# Patient Record
Sex: FEMALE | Race: WHITE | Employment: FULL TIME | URBAN - METROPOLITAN AREA
[De-identification: names, ages, dates, MRNs, and addresses within clinical notes are randomized per-mention and may not be internally consistent; named-entity substitution may affect disease eponyms.]

---

## 2022-03-26 ENCOUNTER — HOSPITAL ENCOUNTER (EMERGENCY)
Age: 22
Discharge: HOME | End: 2022-03-26
Payer: OTHER GOVERNMENT

## 2022-03-26 ENCOUNTER — APPOINTMENT (OUTPATIENT)
Dept: GENERAL RADIOLOGY | Age: 22
End: 2022-03-26
Payer: OTHER GOVERNMENT

## 2022-03-26 VITALS
WEIGHT: 157 LBS | RESPIRATION RATE: 18 BRPM | SYSTOLIC BLOOD PRESSURE: 135 MMHG | DIASTOLIC BLOOD PRESSURE: 92 MMHG | HEIGHT: 64 IN | HEART RATE: 109 BPM | OXYGEN SATURATION: 97 % | TEMPERATURE: 98.7 F | BODY MASS INDEX: 26.8 KG/M2

## 2022-03-26 DIAGNOSIS — S82.832A CLOSED FRACTURE OF DISTAL END OF LEFT FIBULA, UNSPECIFIED FRACTURE MORPHOLOGY, INITIAL ENCOUNTER: Primary | ICD-10-CM

## 2022-03-26 PROCEDURE — 99283 EMERGENCY DEPT VISIT LOW MDM: CPT

## 2022-03-26 PROCEDURE — 73610 X-RAY EXAM OF ANKLE: CPT

## 2022-03-26 PROCEDURE — 6370000000 HC RX 637 (ALT 250 FOR IP): Performed by: PHYSICIAN ASSISTANT

## 2022-03-26 PROCEDURE — 29515 APPLICATION SHORT LEG SPLINT: CPT

## 2022-03-26 RX ORDER — ACETAMINOPHEN 500 MG
1000 TABLET ORAL EVERY 8 HOURS PRN
Qty: 30 TABLET | Refills: 0 | Status: SHIPPED | OUTPATIENT
Start: 2022-03-26 | End: 2022-05-27

## 2022-03-26 RX ORDER — IBUPROFEN 600 MG/1
600 TABLET ORAL 3 TIMES DAILY PRN
Qty: 30 TABLET | Refills: 0 | Status: SHIPPED | OUTPATIENT
Start: 2022-03-26 | End: 2022-05-13

## 2022-03-26 RX ORDER — IBUPROFEN 800 MG/1
800 TABLET ORAL ONCE
Status: COMPLETED | OUTPATIENT
Start: 2022-03-26 | End: 2022-03-26

## 2022-03-26 RX ORDER — ALBUTEROL SULFATE 0.63 MG/3ML
1 SOLUTION RESPIRATORY (INHALATION) EVERY 6 HOURS PRN
COMMUNITY
End: 2022-05-13

## 2022-03-26 RX ADMIN — IBUPROFEN 800 MG: 800 TABLET, FILM COATED ORAL at 17:19

## 2022-03-26 ASSESSMENT — PAIN DESCRIPTION - ORIENTATION: ORIENTATION: LEFT

## 2022-03-26 ASSESSMENT — PAIN DESCRIPTION - PAIN TYPE: TYPE: ACUTE PAIN

## 2022-03-26 ASSESSMENT — PAIN - FUNCTIONAL ASSESSMENT: PAIN_FUNCTIONAL_ASSESSMENT: 0-10

## 2022-03-26 ASSESSMENT — ENCOUNTER SYMPTOMS
SHORTNESS OF BREATH: 0
COLOR CHANGE: 0
CHEST TIGHTNESS: 0
BACK PAIN: 0
COUGH: 0

## 2022-03-26 ASSESSMENT — PAIN DESCRIPTION - ONSET: ONSET: ON-GOING

## 2022-03-26 ASSESSMENT — PAIN DESCRIPTION - FREQUENCY: FREQUENCY: CONTINUOUS

## 2022-03-26 ASSESSMENT — PAIN SCALES - GENERAL
PAINLEVEL_OUTOF10: 6
PAINLEVEL_OUTOF10: 5

## 2022-03-26 ASSESSMENT — PAIN DESCRIPTION - LOCATION: LOCATION: ANKLE

## 2022-03-26 ASSESSMENT — PAIN DESCRIPTION - DESCRIPTORS: DESCRIPTORS: THROBBING;DULL

## 2022-03-26 NOTE — ED PROVIDER NOTES
[facility]        Department of Emergency Medicine   ED  Provider Note  Admit Date/RoomTime: 3/26/2022  3:41 PM  ED Room: [address]  HPI:  3/26/22, Time: 4:03 PM EDT      Patient is a 57-year-old female presenting the emergency department pain and swelling to her left ankle. Patient states that she slipped in mud last night and twisted her ankle. She has not been able to walk on it since. She did take naproxen last night but nothing for pain today. Patient denies any prior trauma or injury to this ankle. She also denies any numbness or tingling, significant swelling, color changes, pain to the knee or foot. The history is provided by the patient. No  was used. REVIEW OF SYSTEMS:  Review of Systems   Constitutional: Negative for activity change, chills, fatigue and fever. Respiratory: Negative for cough, chest tightness and shortness of breath. Cardiovascular: Negative for chest pain, palpitations and leg swelling. Musculoskeletal: Positive for arthralgias and joint swelling. Negative for back pain, myalgias, neck pain and neck stiffness. Skin: Negative for color change, pallor and rash. Neurological: Negative for dizziness, weakness, light-headedness and headaches. Psychiatric/Behavioral: Negative for agitation, behavioral problems and confusion. Pertinent positives and negatives are stated within HPI, all other systems reviewed and are negative.      --------------------------------------------- PAST HISTORY ---------------------------------------------  Past Medical History:  has no past medical history on file. Past Surgical History:  has no past surgical history on file. Social History:      Family History: family history is not on file. The patients home medications have been reviewed.     Allergies: Betadine [povidone-iodine] and Iodine    -------------------------------------------------- RESULTS -------------------------------------------------  All laboratory and radiology results have been personally reviewed by myself   LABS:  No results found for this visit on 03/26/22. RADIOLOGY:  Interpreted by Radiologist.  XR ANKLE LEFT (MIN 3 VIEWS)   Final Result   Distal fibular fracture, appears to extend to the ankle mortise.             ------------------------- NURSING NOTES AND VITALS REVIEWED ---------------------------   The nursing notes within the ED encounter and vital signs as below have been reviewed. BP (!) 135/92   Pulse 109   Temp 98.7 °F (37.1 °C) (Oral)   Resp 18   Ht 5' 4\" (1.626 m)   Wt 157 lb (71.2 kg)   LMP 03/04/2022 (Exact Date)   SpO2 97%   BMI 26.95 kg/m²   Oxygen Saturation Interpretation: Normal      ---------------------------------------------------PHYSICAL EXAM--------------------------------------    Physical Exam  Vitals and nursing note reviewed. Constitutional:       General: She is not in acute distress. Appearance: Normal appearance. She is well-developed. She is not ill-appearing. HENT:      Head: Normocephalic and atraumatic. Mouth/Throat:      Mouth: Mucous membranes are moist.      Pharynx: Oropharynx is clear. Neck:      Vascular: No JVD. Trachea: No tracheal deviation. Cardiovascular:      Rate and Rhythm: Normal rate and regular rhythm. Heart sounds: Normal heart sounds. No murmur heard. Pulmonary:      Effort: Pulmonary effort is normal. No respiratory distress. Breath sounds: Normal breath sounds. Musculoskeletal:         General: Swelling and tenderness present. No deformity. Normal range of motion. Cervical back: Normal range of motion and neck supple. Comments: TTP and edema to RT lateral malleolus. FROM of toes. Distal sensation intact. 2+ DPA pulse. No TTP to the foot. Pain with flexion and extension of the ankle. Skin:     General: Skin is warm and dry.       Capillary Refill: Capillary refill takes less than 2 seconds. Coloration: Skin is not pale. Findings: No erythema. Neurological:      Mental Status: She is alert and oriented to person, place, and time. Mental status is at baseline. Motor: No weakness. Psychiatric:         Mood and Affect: Mood normal.         Behavior: Behavior normal.         Thought Content: Thought content normal.            ------------------------------ ED COURSE/MEDICAL DECISION MAKING----------------------  Medications   ibuprofen (ADVIL;MOTRIN) tablet 800 mg (has no administration in time range)         ED COURSE:      XR ANKLE LEFT (MIN 3 VIEWS)   Final Result   Distal fibular fracture, appears to extend to the ankle mortise. Procedures:  Procedures     Medical Decision Making:   MDM   79-year-old female presenting to the ED with pain and swelling of her left ankle after slipping and falling last night. Patient has no signs of neurovascular compromise. Compartments are soft and compressible. Patient given ibuprofen for the pain. X-ray did show a spiral distal fibular fracture that extends through the ankle more T's. Patient has no symptoms of a proximal injury. She is placed in a short leg three-way splint and advised to be nonweightbearing. She is given a prescription for Tylenol and ibuprofen as well as sent home with crutches. She is to follow-up with orthopedics within 1 week. I did discuss the case with Ortho resident who agrees with the plan. Counseling: The emergency provider has spoken with the patient and discussed todays results, in addition to providing specific details for the plan of care and counseling regarding the diagnosis and prognosis. Questions are answered at this time and they are agreeable with the plan.      --------------------------------- IMPRESSION AND DISPOSITION ---------------------------------    IMPRESSION  1.  Closed fracture of distal end of left fibula, unspecified fracture morphology, initial encounter        DISPOSITION  Disposition: Discharge to home  Patient condition is good      Electronically signed by Js Huang PA-C   DD: 3/26/22  **This report was transcribed using voice recognition software. Every effort was made to ensure accuracy; however, inadvertent computerized transcription errors may be present.   END OF ED PROVIDER NOTE          Js Huang PA-C  03/26/22 [id number]

## 2022-03-26 NOTE — LETTER
[address] [facility] Emergency Department  [address]  [place] [address]  Phone: [phone]               March 26, 2022    Patient: Meir De Anda   YOB: 2000   Date of Visit: 3/26/2022       To Whom It May Concern:    Meir De Anda was seen and treated in our emergency department on 3/26/2022. She may return to work on when cleared by orthopedic surgeon.  .      Sincerely,       Daris Nyhan, PA-C         Signature:__________________________________

## 2022-04-04 ENCOUNTER — TELEPHONE (OUTPATIENT)
Dept: ORTHOPEDIC SURGERY | Age: 22
End: 2022-04-04

## 2022-04-04 NOTE — TELEPHONE ENCOUNTER
Call from pt stating referral sent - was seen in er 2 wks ago, YOA f/u but then referred to Fx clinic. Per [facility] referral rec'd sent to provider for review.

## 2022-04-04 NOTE — TELEPHONE ENCOUNTER
Call placed to patient, appointment made. Future Appointments   Date Time Provider Alejandro Gonzalez   4/7/2022  2:00 PM DO SE Ailin Shearer [facility]     Directions to office provided and patient verbalized understanding and is agreeable with plan.

## 2022-04-07 ENCOUNTER — OFFICE VISIT (OUTPATIENT)
Dept: ORTHOPEDIC SURGERY | Age: 22
End: 2022-04-07
Payer: OTHER GOVERNMENT

## 2022-04-07 ENCOUNTER — HOSPITAL ENCOUNTER (OUTPATIENT)
Dept: GENERAL RADIOLOGY | Age: 22
Discharge: HOME | End: 2022-04-09
Payer: OTHER GOVERNMENT

## 2022-04-07 VITALS — BODY MASS INDEX: 26.8 KG/M2 | HEIGHT: 64 IN | WEIGHT: 157 LBS

## 2022-04-07 DIAGNOSIS — S82.832A CLOSED FRACTURE OF DISTAL END OF LEFT FIBULA, UNSPECIFIED FRACTURE MORPHOLOGY, INITIAL ENCOUNTER: Primary | ICD-10-CM

## 2022-04-07 DIAGNOSIS — S82.832A CLOSED FRACTURE OF DISTAL END OF LEFT FIBULA, UNSPECIFIED FRACTURE MORPHOLOGY, INITIAL ENCOUNTER: ICD-10-CM

## 2022-04-07 PROCEDURE — 29515 APPLICATION SHORT LEG SPLINT: CPT | Performed by: PHYSICIAN ASSISTANT

## 2022-04-07 PROCEDURE — 99203 OFFICE O/P NEW LOW 30 MIN: CPT | Performed by: PHYSICIAN ASSISTANT

## 2022-04-07 PROCEDURE — 73610 X-RAY EXAM OF ANKLE: CPT

## 2022-04-07 NOTE — PROGRESS NOTES
Orthopaedic H&P Note    Vanessa Venegas is a 24 y.o. female, her YOB: 2000 with the following history as recorded in [facility]: There are no problems to display for this patient. Current Outpatient Medications   Medication Sig Dispense Refill    ibuprofen (ADVIL;MOTRIN) 600 MG tablet Take 1 tablet by mouth 3 times daily as needed for Pain 30 tablet 0    acetaminophen (TYLENOL) 500 MG tablet Take 2 tablets by mouth every 8 hours as needed for Pain 30 tablet 0    albuterol (ACCUNEB) 0.63 MG/3ML nebulizer solution Take 1 ampule by nebulization every 6 hours as needed for Wheezing Inhaler as needed (Patient not taking: Reported on 4/7/2022)       No current facility-administered medications for this visit. Allergies: Latex, Betadine [povidone-iodine], and Iodine  No past medical history on file. No past surgical history on file. No family history on file. Social History     Tobacco Use    Smoking status: Current Every Day Smoker     Start date: 4/1/2020    Smokeless tobacco: Never Used   Substance Use Topics    Alcohol use: Not on file                             Chief Complaint   Patient presents with    Ankle Injury     On 3/26/2022 pt slipped and fell in mud fell on ankle, displaced left distal fibula fx, surgical set up, pt ambulating with crutches        SUBJECTIVE: Vanessa Venegas is here for initial evaluation for her left distal fibula ankle fracture after falling/twisting her ankle on 3/26/2022. She was seen in the ED and subsequently splinted and referred to Ortho trauma. No new injury since then. States that her left ankle \"has always been my bad ankle\" states that she has had frequent ankle sprains in the past.  Denies any past fractures or surgeries to that ankle. At baseline, she is fully ambulatory without assistive devices. No other past medical history.   She does have some dependent numbness without specific distribution pattern to the distal leg and ankle that is alleviated with positional changes. Denies any significant pain at this time. States that the back of her splint is rubbing on her heel, but otherwise has maintained nonweightbearing status in her splint without any complications. States she would like to avoid surgery. Review of Systems   Constitutional: Negative for fever, chills, diaphoresis, appetite change and fatigue. HENT: Negative for dental issues, hearing loss and tinnitus. Negative for congestion, sinus pressure, sneezing, sore throat. Negative for headache. Eyes: Negative for visual disturbance, blurred and double vision. Negative for pain, discharge, redness and itching  Respiratory: Negative for cough, shortness of breath and wheezing. Cardiovascular: Negative for chest pain, palpitations and leg swelling. No dyspnea on exertion   Gastrointestinal:   Negative for nausea, vomiting, abdominal pain, diarrhea, constipation  or black or bloody. Hematologic\Lymphatic:  negative for bleeding, petechiae,   Genitourinary: Negative for hematuria and difficulty urinating. Musculoskeletal: Negative for neck pain and stiffness. Mild for back pain, negative joint swelling and gait problem. Skin: Negative for pallor, rash and wound. Neurological: Negative for dizziness, tremors, seizures, weakness, light-headedness, no TIA or stroke symptoms. No numbness and headaches. Psychiatric/Behavioral: Negative.      Physical Examination:   General appearance: alert, well appearing, and in no distress,  normal appearing weight  Mental status: alert, oriented to person, place, and time, normal mood, behavior, speech, dress, motor activity, and thought processes  Abdomen: soft, nondistended   Resp:   resp easy and unlabored, no audible wheezes note  Cardiac: distal pulses palpable, skin well perfused  Neurological: alert, oriented X3, normal speech, no focal findings or movement disorder noted, motor and sensory grossly normal bilaterally, normal muscle tone, no tremors, strength 5/5, normal gait and station  HEENT: normochephalic atraumatic, external ears and eyes normal, sclera normal, neck supple  Extremities:   peripheral pulses normal, no edema, redness or tenderness in the calves   Skin: normal coloration, no rashes or open wounds, no suspicious skin lesions noted  Psych: Affect euthymic   Musculoskeletal:    Extremity:  Left Lower Extremity  Skin clean dry and intact, without signs of infection  Mild mid foot maceration, probably from compromised splint  Mild edema noted surrounding the ankle  Ankle AROM limited secondary to pain and stiffness   Mild TTP lateral ankle   Compartments supple throughout thigh and leg  Calf supple and nontender  Demonstrates active knee flexion/extension, ankle plantar/dorsiflexion/great toe extension. States sensation intact to touch in sural/deep peroneal/superficial peroneal/saphenous/posterior tibial nerve distributions to foot/ankle. Palpable dorsalis pedis and posterior tibialis pulses, cap refill brisk in toes, foot warm/perfused.                Ht 5' 4\" (1.626 m)   Wt 157 lb (71.2 kg)   BMI 26.95 kg/m²      XR: 4/7/22     Narrative   EXAMINATION:   4 XRAY VIEWS OF THE LEFT ANKLE       4/7/2022 2:46 pm       COMPARISON:   Left ankle series from March 26, 2022       HISTORY:   ORDERING SYSTEM PROVIDED HISTORY: Closed fracture of distal end of left   fibula, unspecified fracture morphology, initial encounter   TECHNOLOGIST PROVIDED HISTORY:   With Mortise   Reason for exam:->ankle fracture   What reading provider will be dictating this exam?->CRC       FINDINGS:   No change in position or appearance of comminuted distal left fibular   fracture.  There is minimal displacement of the fracture fragment.  On the   stress view there is suggestion of slight widening of the medial ankle   mortise.  Normal appearance of the talar dome and the base of the 5th   metatarsal.  Persistent lateral malleolar soft tissue swelling.         Impression   No change in position or appearance of distal left fibular fracture.  On   stress view there is suggestion of slight asymmetric widening of the medial   ankle mortise.                   3/26/22:    Narrative   EXAMINATION:   THREE XRAY VIEWS OF THE LEFT ANKLE       3/26/2022 4:04 pm       COMPARISON:   None.       HISTORY:   ORDERING SYSTEM PROVIDED HISTORY: pain and swelling   TECHNOLOGIST PROVIDED HISTORY:   Reason for exam:->pain and swelling   What reading provider will be dictating this exam?->CRC       FINDINGS:   There is spiral fracture through the distal fibula and adjacent soft tissue   swelling.           Impression   Distal fibular fracture, appears to extend to the ankle mortise.                  ASSESSMENT:     Diagnosis Orders   1. Closed fracture of distal end of left fibula, unspecified fracture morphology, initial encounter  XR ANKLE LEFT (MIN 3 VIEWS)       Discussion:  Had lengthy discussion with patient regarding Her diagnosis, typical prognosis, and expected outcomes. I reviewed the possible complications from the injury itself despite treatment choosen. I also discussed treatment options including nonoperative managements versus surgical management, along with risks and benefits of each. They have elected for conservative, non-operative management at this time. Discussed with Dr. Robin Adams as well. Extensively discussed that she has an unstable ankle fracture and would benefit long term from surgical fixation, but patient is refusing surgery at this time. Discussed increased potential for post-traumatic OA and chronic ankle instability. Patient verbalizes understanding to ortho trauma's recommendation of ORIF and would still like to decline surgery. Will immobilize again today and follow closely for change in alignment. PLAN:  · Reviewed x-rays with patient  · Nonweightbearing left lower extremity using assistive devices  · Resplinted today. Discussed splint care.   Patient to return to office sooner than next appointment if splint becomes damaged, wet, etc. and patient verbalized understanding. · otc analgesics, ice, elevation PRN. Pt denying any significant pain today. Future Appointments   Date Time Provider Alejandro Lisa   4/15/2022 10:00 AM SCHEDULE, SE ORTHO RES [facility]         Electronically signed by Erica Alvarado PA-C on 4/8/2022 at 9:54 AM  Note: This report was completed using [employer] voiced recognition software. Every effort has been made to ensure accuracy; however, inadvertent computerized transcription errors may be present.

## 2022-04-15 ENCOUNTER — HOSPITAL ENCOUNTER (OUTPATIENT)
Dept: GENERAL RADIOLOGY | Age: 22
Discharge: HOME | End: 2022-04-17
Payer: OTHER GOVERNMENT

## 2022-04-15 ENCOUNTER — OFFICE VISIT (OUTPATIENT)
Dept: ORTHOPEDIC SURGERY | Age: 22
End: 2022-04-15
Payer: OTHER GOVERNMENT

## 2022-04-15 DIAGNOSIS — S82.842D ANKLE FRACTURE, BIMALLEOLAR, CLOSED, LEFT, WITH ROUTINE HEALING, SUBSEQUENT ENCOUNTER: Primary | ICD-10-CM

## 2022-04-15 DIAGNOSIS — S82.832A CLOSED FRACTURE OF DISTAL END OF LEFT FIBULA, UNSPECIFIED FRACTURE MORPHOLOGY, INITIAL ENCOUNTER: ICD-10-CM

## 2022-04-15 DIAGNOSIS — S82.842D ANKLE FRACTURE, BIMALLEOLAR, CLOSED, LEFT, WITH ROUTINE HEALING, SUBSEQUENT ENCOUNTER: ICD-10-CM

## 2022-04-15 PROCEDURE — 99213 OFFICE O/P EST LOW 20 MIN: CPT | Performed by: ORTHOPAEDIC SURGERY

## 2022-04-15 PROCEDURE — 29405 APPL SHORT LEG CAST: CPT | Performed by: ORTHOPAEDIC SURGERY

## 2022-04-15 PROCEDURE — 73610 X-RAY EXAM OF ANKLE: CPT

## 2022-04-15 PROCEDURE — 99212 OFFICE O/P EST SF 10 MIN: CPT | Performed by: ORTHOPAEDIC SURGERY

## 2022-04-15 NOTE — PATIENT INSTRUCTIONS
Non weightbearing left lower extremity  Return to clinic in 2 weeks for repeat evaluation and xrays  Call the clinic if you have any questions or concerns    If your splint/cast becomes too tight, too loose, wet or damaged please contact our office right away we will need to change out the splint/cast.

## 2022-04-15 NOTE — LETTER
[address] [facility] [address]  [address]  [address] [facility] [address]  Phone: [phone]  Fax: [phone]       April 15, 2022     Patient: Sekou Charles   YOB: 2000   Date of Visit: 4/15/2022       To Whom It May Concern: It is my medical opinion that Sekou Charles requires a disability parking placard for the following reasons:  She cannot walk without assistance from another person or the use of an assistance device (cane, crutch, prosthetic device, wheelchair, etc.). She cannot walk 200 feet without stopping to rest.  She has limited walking ability due to an orthopedic condition. Duration of need: 3 months    If you have any questions or concerns, please don't hesitate to call.     Sincerely,        Karly Segal, DO

## 2022-04-28 DIAGNOSIS — S82.842D ANKLE FRACTURE, BIMALLEOLAR, CLOSED, LEFT, WITH ROUTINE HEALING, SUBSEQUENT ENCOUNTER: Primary | ICD-10-CM

## 2022-04-29 ENCOUNTER — OFFICE VISIT (OUTPATIENT)
Dept: ORTHOPEDIC SURGERY | Age: 22
End: 2022-04-29
Payer: OTHER GOVERNMENT

## 2022-04-29 ENCOUNTER — HOSPITAL ENCOUNTER (OUTPATIENT)
Dept: GENERAL RADIOLOGY | Age: 22
Discharge: HOME | End: 2022-05-01
Payer: OTHER GOVERNMENT

## 2022-04-29 DIAGNOSIS — S82.842D ANKLE FRACTURE, BIMALLEOLAR, CLOSED, LEFT, WITH ROUTINE HEALING, SUBSEQUENT ENCOUNTER: ICD-10-CM

## 2022-04-29 DIAGNOSIS — S82.832A CLOSED FRACTURE OF DISTAL END OF LEFT FIBULA, UNSPECIFIED FRACTURE MORPHOLOGY, INITIAL ENCOUNTER: Primary | ICD-10-CM

## 2022-04-29 PROCEDURE — 73610 X-RAY EXAM OF ANKLE: CPT

## 2022-04-29 PROCEDURE — 99213 OFFICE O/P EST LOW 20 MIN: CPT | Performed by: ORTHOPAEDIC SURGERY

## 2022-04-29 PROCEDURE — 99212 OFFICE O/P EST SF 10 MIN: CPT | Performed by: ORTHOPAEDIC SURGERY

## 2022-04-29 NOTE — PROGRESS NOTES
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 She is NWB on left lower extremity, in cast for the last 2 weeks. She was treated in a splint prior for a total of 5 weeks from injury. Surgery has been discussed with the patient at multiple visits and she would like to continue non operative management despite concerns for fibular shortening and potential future ankle instability. She ambulates with assistive device, crutches. Pain to extremity is mild and is taking prescribed pain medication, NSAIDs. She denies numbness, tingling, weakness to the left lower extremity. Denies calf pain, chest pain, or shortness of breath. Patient states that she works as a  at [employer]. Is asking about when she will return to work. Patient is currently adamant against operative intervention at this time. At her last 2 appointments she was offered surgery and she stated that she wanted to \"take vitamins and drink water to heal her fracture. \"     Review of Systems -  All pertinent positives/negative in HPI     Objective:    General: Alert and oriented X 3, normocephalic atraumatic, external ears and eye normal, sclera clear, no acute distress, respirations easy and unlabored with no audible wheezes, skin warm and dry, speech and dress appropriate for noted age, affect euthymic. Extremity:  Left Lower Extremity  Cast intact in good condition, clean and dry  Calf supple and not tender proximal to cast  Able to flex and extend all toes  Sensation intact distally to all toes  Toes warm and perfused      There were no vitals taken for this visit. XR:   3 views left ankle taken in the cast reviewed demonstrating good alignment of the ankle. No talar tilt. Mortise is near concentric with mild shortening of the fibula.       Assessment:  Left lateral malleolus fracture    Plan:  operative versus nonoperative management was again discussed with the patient and her mother. She was again educated on the potential complications of both operative treatment as well as nonoperative treatment. Both her and her mother expressed understanding. We will continue nonoperative intervention at this time. She will be casted and return to clinic in 2 weeks for repeat evaluation and x-rays. Remain non weight bearing in cast, keep clean dry and intact  Follow up in 2  weeks for repeat evaluation and x-rays    Electronically signed by Garth Edward DO on 4/29/2022 at 11:08 AM    Orthopaedic Attending    I have seen and evaluated the patient with the resident and agree with the above assessments on today's visit. I have performed the key components of the history and physical examination and concur completely with the findings and plans as documented above. Patient still wishes to pursue nonoperative treatment despite discussion on benefits of surgery for her injury. She will maintain her cast and nonweightbearing. We will see her back in orthopedic office in 2 weeks for repeat evaluation with ZAIDO P. Electronically signed by   Freddy Vega DO    NOTE: This report was transcribed using voice recognition software.  Every effort was made to ensure accuracy; however, inadvertent computerized transcription errors may be present

## 2022-05-13 ENCOUNTER — OFFICE VISIT (OUTPATIENT)
Dept: ORTHOPEDIC SURGERY | Age: 22
End: 2022-05-13
Payer: OTHER GOVERNMENT

## 2022-05-13 ENCOUNTER — HOSPITAL ENCOUNTER (OUTPATIENT)
Dept: GENERAL RADIOLOGY | Age: 22
Discharge: HOME | End: 2022-05-15
Payer: OTHER GOVERNMENT

## 2022-05-13 DIAGNOSIS — S82.832A CLOSED FRACTURE OF DISTAL END OF LEFT FIBULA, UNSPECIFIED FRACTURE MORPHOLOGY, INITIAL ENCOUNTER: Primary | ICD-10-CM

## 2022-05-13 DIAGNOSIS — S82.832A CLOSED FRACTURE OF DISTAL END OF LEFT FIBULA, UNSPECIFIED FRACTURE MORPHOLOGY, INITIAL ENCOUNTER: ICD-10-CM

## 2022-05-13 PROCEDURE — L4350 ANKLE CONTROL ORTHO PRE OTS: HCPCS

## 2022-05-13 PROCEDURE — 73610 X-RAY EXAM OF ANKLE: CPT

## 2022-05-13 PROCEDURE — 99213 OFFICE O/P EST LOW 20 MIN: CPT | Performed by: ORTHOPAEDIC SURGERY

## 2022-05-13 PROCEDURE — 99212 OFFICE O/P EST SF 10 MIN: CPT

## 2022-05-13 NOTE — PROGRESS NOTES
Chief Complaint   Patient presents with    Follow-up     follow up displaced, spiral left distal fibula fx(3-26-22), doing much better, denies numbnessm, tingling or burning, denies calf pain. SUBJECTIVE: Samantha Womack is following up from the above injury. She is NWB on left lower extremity, in cast for the last 2 weeks. She was treated in a splint prior for a total of 7 weeks from injury. Surgery has been discussed with the patient at multiple visits and she would like to continue non operative management despite concerns for fibular shortening and potential future ankle instability. She ambulates with assistive device, crutches. Pain to extremity is improved and is taking prescribed pain medication, NSAIDs. She denies numbness, tingling, weakness to the left lower extremity. Denies calf pain, chest pain, or shortness of breath. Review of Systems -   General ROS: negative for - chills, fatigue, fever or night sweats  Respiratory ROS: no cough, shortness of breath, or wheezing  Cardiovascular ROS: no chest pain or dyspnea on exertion  Gastrointestinal ROS: no abdominal pain, change in bowel habits, or black or bloody stools  Genitourinary: no hematuria, dysuria, or incontinence   Musculoskeletal ROS:see above  Neurological ROS: no TIA or stroke symptoms       OBJECTIVE:   Alert and oriented X 3, no acute distress, respirations easy and unlabored with no audible wheezes, skin warm and dry, speech and dress appropriate for noted age, affect euthymic. Extremity:  Left upper extremity:  · Cast C/D/I, removed today  · Compartments soft and compressible  · Calf soft and non tender  · +PF/DF/EHL  · +2/4 DP & PT pulses, Brisk Cap refill, Toes warm and perfused  · Distal sensation grossly intact to Peroneals, Sural, Saphenous, and tibial nrs    XR: 5/13/22   X-ray left ankle: Distal fibula fracture with near concentric mortise. Mild fibular shortening appreciated.   No gross callus appreciated about the fracture site. Impression: distal fibula fracture    There were no vitals taken for this visit. ASSESSMENT:     Diagnosis Orders   1. Closed fracture of distal end of left fibula, unspecified fracture morphology, initial encounter  DME Order for Crutches as OP       PLAN:  Operative versus nonoperative management was again discussed with the patient. She was again educated on the potential complications of both operative treatment as well as nonoperative treatment. It was discussed that there is concern that the patient is almost 7 weeks out from her initial injury and has yet to have evidence of bony callus appreciated on x-rays. Risks, benefits, and complications of operative and nonoperative treatment options were discussed again with the patient. She was also encouraged to be recasted and follow-up in 2 weeks. Patient expressed that she would not like a cast and would like a walking boot. Risks of walking boot were discussed with the patient and she elected to proceed with walking boot instead of casting. The importance of adhering to strict nonweightbearing to the left lower extremity as well as wearing of the walking boot 24/7 except for showering/bathing. Patient verbalized understanding was in agreement with treatment plan. We will continue nonoperative intervention at this time. Remain non weight bearing in walking boot, keep clean dry and intact. Follow up in 2  weeks for repeat evaluation and x-rays    All questions were sought and answered today's visit    Electronically signed by Pam Escoto DO on 5/13/2022 at 8:49 AM  Note: This report was completed using [employer]frame voiced recognition software. Every effort has been made to ensure accuracy; however, inadvertent computerized transcription errors may be present. Patient is almost 7 weeks out from a nonoperative left distal fibula fracture. Patient currently not showing signs of callus. No further change in alignment.   Did talk to her in detail about smoking cessation and nutrition. Also offered her a cast she would like to go into a boot. Told to be nonweightbearing for another 2 to 3 weeks while we see if she starts to form some callus. Explained there is a chance she may need a nonunion surgery. Her tenderness is improving over her fibular fracture though. Her questions were answered and she is agreeable to the plan of following up 2 weeks after being in a boot working on gentle range of motion.

## 2022-05-23 DIAGNOSIS — S82.842D ANKLE FRACTURE, BIMALLEOLAR, CLOSED, LEFT, WITH ROUTINE HEALING, SUBSEQUENT ENCOUNTER: Primary | ICD-10-CM

## 2022-05-27 ENCOUNTER — HOSPITAL ENCOUNTER (OUTPATIENT)
Dept: GENERAL RADIOLOGY | Age: 22
Discharge: HOME | End: 2022-05-29
Payer: OTHER GOVERNMENT

## 2022-05-27 ENCOUNTER — OFFICE VISIT (OUTPATIENT)
Dept: ORTHOPEDIC SURGERY | Age: 22
End: 2022-05-27
Payer: OTHER GOVERNMENT

## 2022-05-27 DIAGNOSIS — S82.832G CLOSED FRACTURE OF DISTAL END OF LEFT FIBULA WITH DELAYED HEALING, UNSPECIFIED FRACTURE MORPHOLOGY, SUBSEQUENT ENCOUNTER: Primary | ICD-10-CM

## 2022-05-27 DIAGNOSIS — S82.842D ANKLE FRACTURE, BIMALLEOLAR, CLOSED, LEFT, WITH ROUTINE HEALING, SUBSEQUENT ENCOUNTER: ICD-10-CM

## 2022-05-27 PROCEDURE — 99213 OFFICE O/P EST LOW 20 MIN: CPT | Performed by: ORTHOPAEDIC SURGERY

## 2022-05-27 PROCEDURE — 73610 X-RAY EXAM OF ANKLE: CPT

## 2022-05-27 PROCEDURE — 99212 OFFICE O/P EST SF 10 MIN: CPT

## 2022-05-27 PROCEDURE — 99212 OFFICE O/P EST SF 10 MIN: CPT | Performed by: PHYSICIAN ASSISTANT

## 2022-05-27 NOTE — PROGRESS NOTES
Chief Complaint   Patient presents with    Follow-up     follow up displaced, spiral left distal fibula fx, doing better, not taking anything for pain, denies numbness, tingling or or burning. SUBJECTIVE:   Mindi Goldberg is following up from the above injury. She is NWB on left lower extremity, currently in a walking boot. Surgery has been discussed with the patient at multiple visits and she would like to continue non operative management despite concerns for fibular shortening and potential future ankle instability. She ambulates with assistive device, crutches. Patient reports that she would like to attempt non-surgical management to allow her body to heal on its own prior to operative management. If non-operative management fail, she would consider operative management. Patient reports no pain on today's examination. She adheres to the nonweightbearing status and indicated that she has formal quit vaping. Patient currently taking NSAIDs for pain management. She denies numbness, tingling, weakness to the left lower extremity. Denies calf pain, chest pain, or shortness of breath. Review of Systems -   General ROS: negative for - chills, fatigue, fever or night sweats  Respiratory ROS: no cough, shortness of breath, or wheezing  Cardiovascular ROS: no chest pain or dyspnea on exertion  Gastrointestinal ROS: no abdominal pain, change in bowel habits, or black or bloody stools  Genitourinary: no hematuria, dysuria, or incontinence   Musculoskeletal ROS:see above  Neurological ROS: no TIA or stroke symptoms       OBJECTIVE:   Alert and oriented X 3, no acute distress, respirations easy and unlabored with no audible wheezes, skin warm and dry, speech and dress appropriate for noted age, affect euthymic. Extremity:  Left lower extremity:  · Patient in walker boot. Boot removed to assess skin and ROM  · -ve TTP at the tarsals and metatarsals, and medial and lateral malleolous.    · Compartments soft and compressible  · Calf soft and non tender  · +PF/DF/EHL  · +2/4 DP & PT pulses, Brisk Cap refill, Toes warm and perfused  · Distal sensation grossly intact to Peroneals, Sural, Saphenous, and tibial nrs    XR: 5/13/22   X-ray left ankle: Distal fibula fracture with near concentric mortise. Mild fibular shortening appreciated. Present of some callus formation. Impression: distal fibula fracture    There were no vitals taken for this visit. ASSESSMENT:     Diagnosis Orders   1. Closed fracture of distal end of left fibula with delayed healing, unspecified fracture morphology, subsequent encounter         PLAN:  Operative versus nonoperative management was again discussed with the patient. She was again educated on the potential complications of both operative treatment as well as nonoperative treatment. It was discussed that there is concern that the patient is almost 9 weeks out from her initial injury and display some bony callus appreciated on x-rays. The importance of adhering to strict nonweightbearing to the left lower extremity as well as wearing of the walking boot 24/7 except for showering/bathing. Patient verbalized understanding was in agreement with treatment plan. We will continue nonoperative intervention at this time. Remain non weight bearing in walking boot, keep clean dry and intact. Patient can start with gentle ROM of the ankle  Continue with current pain regimen  Continue with Vitamin D and Vitamin C  Follow up in 2  weeks for repeat evaluation and x-rays          Electronically signed by Joycelyn Soriano DO on 5/27/2022 at 10:04 AM  Note: This report was completed using [employer] voiced recognition software. Every effort has been made to ensure accuracy; however, inadvertent computerized transcription errors may be present. I have seen and evaluated the patient and agree with the above assessment on today's visit.  I have performed the key components of the history and physical examination and concur completely with the findings and plans as documented. Agree with ROS, examination, FMH, PMH, PSH, SocHx, and allergies as above. Patient evaluated the resident. Patient with a left fibula fracture which is about 9 weeks out does show callus. She does have some shortening. Her mortise is still concentric. Patient does have fibular shortening although there is callus formation. We will see her back in about 3 to 4 weeks at that point time we can lift all restrictions. She can start gentle range of motion at this point time. She is agreeable with the plan. She remained nonoperative which is reasonable. History reviewed. No pertinent past medical history. History reviewed. No pertinent surgical history. History reviewed. No pertinent family history. Social History     Tobacco Use    Smoking status: Former Smoker     Start date: 4/1/2020    Smokeless tobacco: Never Used   Substance Use Topics    Alcohol use: Not on file    Drug use: Not on file     Allergies   Allergen Reactions    Latex Swelling and Rash    Betadine [Povidone-Iodine] Anaphylaxis    Iodine Anaphylaxis           Physical Examination:   General appearance: alert, well appearing, and in no distress,  normal appearing weight. No visible signs of trauma   Mental status: alert, oriented to person, place, and time, normal mood, behavior, speech, dress, motor activity, and thought processes  Abdomen: soft, nondistended  Resp:   resp easy and unlabored, no audible wheezes note, normal symmetrical expansion of both hemithoraces  Cardiac: distal pulses palpable, skin and extremities well perfused  Neurological: alert, oriented X3, normal speech, no focal findings or movement disorder noted, motor and sensory grossly normal bilaterally, normal muscle tone, no tremors  HEENT: normochephalic atraumatic, external ears and eyes normal, sclera normal, neck supple, no nasal discharge.    Extremities:   peripheral pulses normal, no edema, redness or tenderness in the calves   Skin: normal coloration, no rashes or open wounds, no suspicious skin lesions noted  Psych: Affect euthymic   Musculoskeletal:   Extremity:  With above examination. Minimal tenderness palpation of the fracture site. Neurovascular intact distally with compartment soft compressible. Calves are soft nontender. ELECTRONICALLY signed by:    Yee Soot MD  6/2/22   This is been dictated utilizing voice recognition software. All efforts have been made to make the note accurate although inadvertent errors may be present.

## 2022-05-27 NOTE — PATIENT INSTRUCTIONS
Weightbearing: NWB    Activity restrictions: No strenuous exercise. Swelling Control: Elevate leg(s) above the level of the heart when sitting     Pain Control:Continue current pain regimen    Continue: NWB at LLE in walking boot. Patient can perform gentle ankle pumps when out of the boot. Continue with Vitamin D and Vitamin C    Follow up: In 2 weeks    Please call the office at [phone] or send [employer] message to providers sooner with any questions or concerns  Strongly recommend all of our patients sign up for [employer] in order to have direct communication VIA [employer] [facility]NE with our clinic staff.

## 2022-06-03 DIAGNOSIS — S82.842D ANKLE FRACTURE, BIMALLEOLAR, CLOSED, LEFT, WITH ROUTINE HEALING, SUBSEQUENT ENCOUNTER: Primary | ICD-10-CM

## 2022-06-10 ENCOUNTER — HOSPITAL ENCOUNTER (OUTPATIENT)
Dept: GENERAL RADIOLOGY | Age: 22
Discharge: HOME | End: 2022-06-12

## 2022-06-10 ENCOUNTER — OFFICE VISIT (OUTPATIENT)
Dept: ORTHOPEDIC SURGERY | Age: 22
End: 2022-06-10

## 2022-06-10 DIAGNOSIS — S82.832G CLOSED FRACTURE OF DISTAL END OF LEFT FIBULA WITH DELAYED HEALING, UNSPECIFIED FRACTURE MORPHOLOGY, SUBSEQUENT ENCOUNTER: ICD-10-CM

## 2022-06-10 DIAGNOSIS — S82.842D ANKLE FRACTURE, BIMALLEOLAR, CLOSED, LEFT, WITH ROUTINE HEALING, SUBSEQUENT ENCOUNTER: ICD-10-CM

## 2022-06-10 PROCEDURE — 99212 OFFICE O/P EST SF 10 MIN: CPT

## 2022-06-10 PROCEDURE — 99212 OFFICE O/P EST SF 10 MIN: CPT | Performed by: ORTHOPAEDIC SURGERY

## 2022-06-10 PROCEDURE — 73610 X-RAY EXAM OF ANKLE: CPT

## 2022-06-10 PROCEDURE — 99213 OFFICE O/P EST LOW 20 MIN: CPT | Performed by: ORTHOPAEDIC SURGERY

## 2022-06-10 NOTE — PROGRESS NOTES
Chief Complaint   Patient presents with    Ankle Pain     left distal fibula fx, 3/26/2022 non-op       SUBJECTIVE:   Camila Cheung is following up from the above injury. She continues with NWB on left lower extremity, remains in a walking boot. Surgery had previously been discussed with the patient at multiple visits and she opted to continue non operative management despite concerns for fibular shortening and potential future ankle instability. She ambulates with assistive device, crutches. Patient reports that she would like to attempt non-surgical management to allow her body to heal on its own. If non-operative management fail, she would consider operative management. Patient reports no pain on today's examination. She adheres to the nonweightbearing status. Patient has begun gentle range of motion exercises while out of the boot. She denies numbness, tingling, weakness to the left lower extremity. Denies calf pain, chest pain, or shortness of breath. No other complaints today. Review of Systems -   General ROS: negative for - chills, fatigue, fever or night sweats  Respiratory ROS: no cough, shortness of breath, or wheezing  Cardiovascular ROS: no chest pain or dyspnea on exertion  Gastrointestinal ROS: no abdominal pain, change in bowel habits, or black or bloody stools  Genitourinary: no hematuria, dysuria, or incontinence   Musculoskeletal ROS:see above  Neurological ROS: no TIA or stroke symptoms       OBJECTIVE:   Alert and oriented X 3, no acute distress, respirations easy and unlabored with no audible wheezes, skin warm and dry, speech and dress appropriate for noted age, affect euthymic. Extremity:  Left lower extremity:  · Patient in walker boot. Boot removed to assess skin and ROM  · Negative tenderness about medial and lateral malleolous.    · Compartments soft and compressible  · Calf soft and non tender  · +PF/DF/EHL  · +2/4 DP & PT pulses, Brisk Cap refill, Toes warm and perfused  · Distal sensation grossly intact to Peroneals, Sural, Saphenous, and tibial nrs    XR: 5/13/22   X-ray left ankle: Distal fibula fracture with subtle talar tilt at the ankle mortise. Mild fibular shortening appreciated. There is some callus formation. Distal fibula remains in unchanged alignment in comparison to prior radiographs      ASSESSMENT:  Closed treatment left distal fibula fracture    PLAN:  Patient presents today with family in room. We reviewed patient's treatment course to date. She remains happy with her decision for nonoperative management for her left ankle fracture. She is doing well at this time with no pain in the left ankle. She has been compliant with nonweightbearing and adherence with walking boot. We explained that her x-rays do demonstrate some callus formation however we would like to protect her fracture site for several more weeks before allowing patient to weight-bear. She will continue with walking boot for 2 more weeks. She may come out of the walking boot for gentle range of motion exercises about the left ankle. She understands nonweightbearing restriction until seen in office. If she develops new or worsening pain in the left ankle, she will call the office sooner for evaluation. Follow up in 2 weeks for repeat evaluation and x-rays    Patient seen and plan discussed with Dr. Hilda Severino DO  Orthopedic Surgery Resident  PGY-3      Orthopaedic Attending    I have seen and evaluated the patient with the resident and agree with the above assessments on today's visit. I have performed the key components of the history and physical examination and concur completely with the findings and plans as documented above. Electronically signed by   Aric Vinson DO  6/10/2022     Note: This report was completed using [employer] voiced recognition software.   Every effort has been made to ensure accuracy; however, inadvertent computerized transcription errors may be present.

## 2022-06-10 NOTE — PATIENT INSTRUCTIONS
Continue nonweightbearing bearing left lower extremity in walking boot  Okay to come out of boot for gentle range of motion  Please follow-up in 2 weeks for repeat evaluation

## 2023-08-13 PROCEDURE — 96374 THER/PROPH/DIAG INJ IV PUSH: CPT

## 2023-08-13 PROCEDURE — 99285 EMERGENCY DEPT VISIT HI MDM: CPT

## 2023-08-13 PROCEDURE — 96375 TX/PRO/DX INJ NEW DRUG ADDON: CPT

## 2023-08-13 ASSESSMENT — LIFESTYLE VARIABLES
HOW OFTEN DO YOU HAVE A DRINK CONTAINING ALCOHOL: NEVER
HOW MANY STANDARD DRINKS CONTAINING ALCOHOL DO YOU HAVE ON A TYPICAL DAY: PATIENT DOES NOT DRINK

## 2023-08-13 ASSESSMENT — PAIN - FUNCTIONAL ASSESSMENT: PAIN_FUNCTIONAL_ASSESSMENT: NONE - DENIES PAIN

## 2023-08-14 ENCOUNTER — HOSPITAL ENCOUNTER (EMERGENCY)
Age: 23
Discharge: HOME | End: 2023-08-14
Attending: EMERGENCY MEDICINE
Payer: COMMERCIAL

## 2023-08-14 ENCOUNTER — APPOINTMENT (OUTPATIENT)
Dept: CT IMAGING | Age: 23
End: 2023-08-14
Payer: COMMERCIAL

## 2023-08-14 VITALS
DIASTOLIC BLOOD PRESSURE: 67 MMHG | HEART RATE: 87 BPM | HEIGHT: 64 IN | BODY MASS INDEX: 26.46 KG/M2 | TEMPERATURE: 97.4 F | RESPIRATION RATE: 16 BRPM | OXYGEN SATURATION: 98 % | WEIGHT: 155 LBS | SYSTOLIC BLOOD PRESSURE: 111 MMHG

## 2023-08-14 DIAGNOSIS — R20.0 NUMBNESS: Primary | ICD-10-CM

## 2023-08-14 DIAGNOSIS — R51.9 HEADACHE, UNSPECIFIED HEADACHE TYPE: ICD-10-CM

## 2023-08-14 LAB
ALBUMIN SERPL-MCNC: 4.6 G/DL (ref 3.5–5.2)
ALP SERPL-CCNC: 88 U/L (ref 35–104)
ALT SERPL-CCNC: 15 U/L (ref 0–32)
ANION GAP SERPL CALCULATED.3IONS-SCNC: 11 MMOL/L (ref 7–16)
AST SERPL-CCNC: 16 U/L (ref 0–31)
BASOPHILS # BLD: 0.06 K/UL (ref 0–0.2)
BASOPHILS NFR BLD: 1 % (ref 0–2)
BILIRUB SERPL-MCNC: 1.4 MG/DL (ref 0–1.2)
BUN SERPL-MCNC: 9 MG/DL (ref 6–20)
CALCIUM SERPL-MCNC: 9.6 MG/DL (ref 8.6–10.2)
CHLORIDE SERPL-SCNC: 102 MMOL/L (ref 98–107)
CO2 SERPL-SCNC: 23 MMOL/L (ref 22–29)
CREAT SERPL-MCNC: 0.8 MG/DL (ref 0.5–1)
EKG ATRIAL RATE: 87 BPM
EKG P AXIS: 69 DEGREES
EKG P-R INTERVAL: 172 MS
EKG Q-T INTERVAL: 362 MS
EKG QRS DURATION: 76 MS
EKG QTC CALCULATION (BAZETT): 435 MS
EKG R AXIS: 71 DEGREES
EKG T AXIS: 62 DEGREES
EKG VENTRICULAR RATE: 87 BPM
EOSINOPHIL # BLD: 0.12 K/UL (ref 0.05–0.5)
EOSINOPHILS RELATIVE PERCENT: 1 % (ref 0–6)
ERYTHROCYTE [DISTWIDTH] IN BLOOD BY AUTOMATED COUNT: 13.1 % (ref 11.5–15)
GFR SERPL CREATININE-BSD FRML MDRD: >60 ML/MIN/1.73M2
GLUCOSE SERPL-MCNC: 114 MG/DL (ref 74–99)
HCG, URINE, POC: NEGATIVE
HCT VFR BLD AUTO: 39.4 % (ref 34–48)
HGB BLD-MCNC: 12.7 G/DL (ref 11.5–15.5)
IMM GRANULOCYTES # BLD AUTO: 0.09 K/UL (ref 0–0.58)
IMM GRANULOCYTES NFR BLD: 1 % (ref 0–5)
LYMPHOCYTES NFR BLD: 1.38 K/UL (ref 1.5–4)
LYMPHOCYTES RELATIVE PERCENT: 15 % (ref 20–42)
Lab: NORMAL
MCH RBC QN AUTO: 28.7 PG (ref 26–35)
MCHC RBC AUTO-ENTMCNC: 32.2 G/DL (ref 32–34.5)
MCV RBC AUTO: 88.9 FL (ref 80–99.9)
MONOCYTES NFR BLD: 0.44 K/UL (ref 0.1–0.95)
MONOCYTES NFR BLD: 5 % (ref 2–12)
NEGATIVE QC PASS/FAIL: NORMAL
NEUTROPHILS NFR BLD: 78 % (ref 43–80)
NEUTS SEG NFR BLD: 7.45 K/UL (ref 1.8–7.3)
PLATELET # BLD AUTO: 239 K/UL (ref 130–450)
PMV BLD AUTO: 11.3 FL (ref 7–12)
POSITIVE QC PASS/FAIL: NORMAL
POTASSIUM SERPL-SCNC: 3.6 MMOL/L (ref 3.5–5)
PROT SERPL-MCNC: 7.7 G/DL (ref 6.4–8.3)
RBC # BLD AUTO: 4.43 M/UL (ref 3.5–5.5)
SODIUM SERPL-SCNC: 136 MMOL/L (ref 132–146)
WBC OTHER # BLD: 9.5 K/UL (ref 4.5–11.5)

## 2023-08-14 PROCEDURE — 96375 TX/PRO/DX INJ NEW DRUG ADDON: CPT

## 2023-08-14 PROCEDURE — 2500000003 HC RX 250 WO HCPCS

## 2023-08-14 PROCEDURE — 85025 COMPLETE CBC W/AUTO DIFF WBC: CPT

## 2023-08-14 PROCEDURE — 93010 ELECTROCARDIOGRAM REPORT: CPT | Performed by: INTERNAL MEDICINE

## 2023-08-14 PROCEDURE — 6360000002 HC RX W HCPCS

## 2023-08-14 PROCEDURE — 70498 CT ANGIOGRAPHY NECK: CPT

## 2023-08-14 PROCEDURE — 93005 ELECTROCARDIOGRAM TRACING: CPT

## 2023-08-14 PROCEDURE — 96374 THER/PROPH/DIAG INJ IV PUSH: CPT

## 2023-08-14 PROCEDURE — 70450 CT HEAD/BRAIN W/O DYE: CPT

## 2023-08-14 PROCEDURE — 2580000003 HC RX 258

## 2023-08-14 PROCEDURE — 6360000004 HC RX CONTRAST MEDICATION: Performed by: RADIOLOGY

## 2023-08-14 PROCEDURE — 80053 COMPREHEN METABOLIC PANEL: CPT

## 2023-08-14 PROCEDURE — 70496 CT ANGIOGRAPHY HEAD: CPT

## 2023-08-14 RX ORDER — DIPHENHYDRAMINE HYDROCHLORIDE 50 MG/ML
25 INJECTION INTRAMUSCULAR; INTRAVENOUS ONCE
Status: COMPLETED | OUTPATIENT
Start: 2023-08-14 | End: 2023-08-14

## 2023-08-14 RX ADMIN — DIPHENHYDRAMINE HYDROCHLORIDE 25 MG: 50 INJECTION, SOLUTION INTRAMUSCULAR; INTRAVENOUS at 02:19

## 2023-08-14 RX ADMIN — IOPAMIDOL 75 ML: 755 INJECTION, SOLUTION INTRAVENOUS at 02:50

## 2023-08-14 RX ADMIN — SODIUM CHLORIDE, PRESERVATIVE FREE 20 MG: 5 INJECTION INTRAVENOUS at 02:17

## 2023-08-14 ASSESSMENT — PAIN - FUNCTIONAL ASSESSMENT: PAIN_FUNCTIONAL_ASSESSMENT: NONE - DENIES PAIN

## 2023-08-14 NOTE — ED PROVIDER NOTES
Arm Motor Drift 0 - no drift, limb holds 90 (or 45) degrees for full 10 seconds   6A: Test Left Leg Motor Drift 0 - no drift; leg holds 30 degree position for full 5 seconds   6B: Test Right Leg Motor Drift 0 - no drift; leg holds 30 degree position for full 5 seconds   7: Test Limb Ataxia   (FNF/Heel-Shin) 0 - absent   8: Test Sensation 0 - normal; no sensory loss   9: Test Language/Aphasia 0 - no aphasia, normal   10: Test Dysarthria 0 - normal   11: Test Extinction/Inattention 0 - no abnormality   Total NIH Stroke Score: 0     tPA Criteria*  Inclusion criteria:  - Ischemic stroke onset within 3 hours of drug administration  - Age 25 or older  - No hemorrhage or non-stroke cause of deficit on CT  - Measurable deficit on NIH Stroke Scale    Exclusion criteria: If the patient. ...  - has minor or improving symptoms  - had seizure at onset of stroke  - has had another stroke or serious head trauma within the last 3 months  - has had major surgery within the last 14 days  - has known history of intracranial hemorrhage  - has sustained systolic blood pressure >936 mmHg  - has sustained diastolic blood pressure >203 mmHg  - requires aggressive treatment is necessary to lower their blood pressure  - has symptoms suggestive of subarachnoid hemorrhage  - has had GI or urinary tract hemorrhage within the last 21 days  - has had an arterial puncture at a non-compressible site within the last 7 days  - received heparin within the last 48 hours and has an elevated PTT  - has a prothrombin time (PT) >15 seconds  - has a platelet count <696,238 uL  - serum blood glucose is <50 mg/dL or >400 mg/dL    Relative Contraindications:  - NIH Stroke score >22  - Patient's CT shows evidence of large MCA territory infarction (>1/3 the MCA territory)    *[facility] Policy Paper      Acute CVA Core Measures:     - t-PA Eligibility: IV t-PA was considered and not given due to violations in inclusion criteria including mild/rapidly resolving

## 2023-08-14 NOTE — ED NOTES
Radiology Procedure Waiver   Name: Cristina Fried  : 2000  MRN: [id number]    Date:  23    Time: 12:29 AM EDT    Benefits of immediately proceeding with Radiology exam(s) without pre-testing outweigh the risks or are not indicated as specified below and therefore the following is/are being waived:    [x] Pregnancy test   [] Patients LMP on-time and regular.   [] Patient had Tubal Ligation or has other Contraception Device. [] Patient  is Menopausal or Premenarcheal.    [] Patient had Full or Partial Hysterectomy. [x] Protocol for Iodine allergy    [] MRI Questionnaire     [x] BUN/Creatinine   [] Patient age w/no hx of renal dysfunction. [] Patient on Dialysis. [] Recent Normal Labs.   Electronically signed by Filiberto Barth MD on 23 at 12:29 AM EDT               Filiberto Barth MD  [id number]

## 2024-01-16 ENCOUNTER — OFFICE VISIT (OUTPATIENT)
Dept: PRIMARY CARE CLINIC | Age: 24
End: 2024-01-16

## 2024-01-16 VITALS
RESPIRATION RATE: 16 BRPM | TEMPERATURE: 97.5 F | HEART RATE: 75 BPM | SYSTOLIC BLOOD PRESSURE: 122 MMHG | BODY MASS INDEX: 27.78 KG/M2 | OXYGEN SATURATION: 100 % | HEIGHT: 67 IN | WEIGHT: 177 LBS | DIASTOLIC BLOOD PRESSURE: 79 MMHG

## 2024-01-16 DIAGNOSIS — A64 STD (FEMALE): Primary | ICD-10-CM

## 2024-01-16 DIAGNOSIS — R81 GLUCOSURIA: ICD-10-CM

## 2024-01-16 DIAGNOSIS — N89.8 VAGINAL ITCHING: ICD-10-CM

## 2024-01-16 LAB
BILIRUBIN, POC: NEGATIVE
BLOOD URINE, POC: NORMAL
CHP ED QC CHECK: NORMAL
CLARITY, POC: CLEAR
COLOR, POC: YELLOW
CONTROL: NORMAL
GLUCOSE BLD-MCNC: 93 MG/DL
GLUCOSE URINE, POC: NORMAL
KETONES, POC: NEGATIVE
LEUKOCYTE EST, POC: NORMAL
NITRITE, POC: NEGATIVE
PH, POC: 6.5
PREGNANCY TEST URINE, POC: NEGATIVE
PROTEIN, POC: NEGATIVE
SPECIFIC GRAVITY, POC: 1.02
UROBILINOGEN, POC: 0.2

## 2024-01-16 PROCEDURE — 99204 OFFICE O/P NEW MOD 45 MIN: CPT | Performed by: NURSE PRACTITIONER

## 2024-01-16 PROCEDURE — 81002 URINALYSIS NONAUTO W/O SCOPE: CPT | Performed by: NURSE PRACTITIONER

## 2024-01-16 PROCEDURE — 82962 GLUCOSE BLOOD TEST: CPT | Performed by: NURSE PRACTITIONER

## 2024-01-16 PROCEDURE — 81025 URINE PREGNANCY TEST: CPT | Performed by: NURSE PRACTITIONER

## 2024-01-16 RX ORDER — FLUCONAZOLE 150 MG/1
TABLET ORAL
Qty: 2 TABLET | Refills: 0 | Status: SHIPPED | OUTPATIENT
Start: 2024-01-16

## 2024-01-16 NOTE — PROGRESS NOTES
lb)   LMP 01/12/2024   SpO2 100%   BMI 27.72 kg/m²    Oxygen Saturation Interpretation: Normal.    Constitutional/General: Alert and oriented x3, well appearing, non toxic in NAD  Head: Normocephalic and atraumatic  Eyes: PERRL, EOMI  Mouth: Oropharynx clear, handling secretions,  Neck: Supple, full ROM  Pulmonary: Lungs clear to auscultation bilaterally, no wheezes, rales, or rhonchi. Not in respiratory distress  Cardiovascular:  Regular rate. Regular rhythm. No murmurs, gallops, or rubs. 2+ distal pulses  Chest: no chest wall tenderness  Abdomen: Soft.  Non tender. Non distended.  +BS.  No rebound, guarding, or rigidity. No pulsatile masses appreciated.  Musculoskeletal: Moves all extremities x 4. Warm and well perfused. Capillary refill <3 seconds  Skin: warm and dry. No rashes.   Neurologic: GCS 15  Psych: Normal Affect  Pelvic exam: exam declined by the patient.     Test Results Section   (All laboratory and radiology results have been personally reviewed by myself)  Labs:  Results for orders placed or performed in visit on 01/16/24   POCT Urinalysis no Micro   Result Value Ref Range    Color, UA Yellow     Clarity, UA Clear     Glucose, UA POC 500mg     Bilirubin, UA Negative     Ketones, UA Negative     Spec Grav, UA 1.025     Blood, UA POC Trace-intact     pH, UA 6.5     Protein, UA POC Negative     Urobilinogen, UA 0.2     Leukocytes, UA Trace     Nitrite, UA Negative    POCT urine pregnancy   Result Value Ref Range    Preg Test, Ur NEGATIVE     Control     POCT Glucose   Result Value Ref Range    POC Glucose 93     QC OK?       Assessment / Plan   Impression(s):  1. STD (female)  - POCT Urinalysis no Micro  - POCT urine pregnancy  - C.trachomatis N.gonorrhoeae DNA, Urine  - Culture, Genital  - Culture, Urine; Future  - Trichomonas Screen    2. Glucosuria  - POCT Glucose    3. Vaginal itching  - fluconazole (DIFLUCAN) 150 MG tablet; 1 tab po x 1 dose, may repeat in 72 hrs if still symptomatic  Dispense:

## 2024-01-18 LAB
C. TRACHOMATIS DNA ,URINE: NEGATIVE
N. GONORRHOEAE DNA, URINE: NEGATIVE

## 2024-01-20 LAB
CULTURE: ABNORMAL
SPECIMEN DESCRIPTION: ABNORMAL

## 2024-01-21 LAB
CULTURE: NORMAL
SPECIMEN DESCRIPTION: NORMAL

## 2024-04-15 PROCEDURE — 99284 EMERGENCY DEPT VISIT MOD MDM: CPT

## 2024-04-15 RX ORDER — 0.9 % SODIUM CHLORIDE 0.9 %
1000 INTRAVENOUS SOLUTION INTRAVENOUS ONCE
Status: COMPLETED | OUTPATIENT
Start: 2024-04-15 | End: 2024-04-16

## 2024-04-15 RX ORDER — METOCLOPRAMIDE HYDROCHLORIDE 5 MG/ML
10 INJECTION INTRAMUSCULAR; INTRAVENOUS ONCE
Status: COMPLETED | OUTPATIENT
Start: 2024-04-15 | End: 2024-04-16

## 2024-04-15 RX ORDER — DIPHENHYDRAMINE HYDROCHLORIDE 50 MG/ML
25 INJECTION INTRAMUSCULAR; INTRAVENOUS ONCE
Status: COMPLETED | OUTPATIENT
Start: 2024-04-15 | End: 2024-04-16

## 2024-04-15 ASSESSMENT — PAIN - FUNCTIONAL ASSESSMENT: PAIN_FUNCTIONAL_ASSESSMENT: NONE - DENIES PAIN

## 2024-04-16 ENCOUNTER — APPOINTMENT (OUTPATIENT)
Dept: CT IMAGING | Age: 24
End: 2024-04-16
Payer: COMMERCIAL

## 2024-04-16 ENCOUNTER — HOSPITAL ENCOUNTER (EMERGENCY)
Age: 24
Discharge: HOME | End: 2024-04-16
Payer: COMMERCIAL

## 2024-04-16 VITALS
OXYGEN SATURATION: 100 % | RESPIRATION RATE: 14 BRPM | BODY MASS INDEX: 29.88 KG/M2 | HEART RATE: 67 BPM | SYSTOLIC BLOOD PRESSURE: 123 MMHG | DIASTOLIC BLOOD PRESSURE: 75 MMHG | HEIGHT: 64 IN | TEMPERATURE: 97.7 F | WEIGHT: 175 LBS

## 2024-04-16 DIAGNOSIS — R51.9 NONINTRACTABLE HEADACHE, UNSPECIFIED CHRONICITY PATTERN, UNSPECIFIED HEADACHE TYPE: Primary | ICD-10-CM

## 2024-04-16 LAB
ALBUMIN SERPL-MCNC: 4.6 G/DL (ref 3.5–5.2)
ALP SERPL-CCNC: 112 U/L (ref 35–104)
ALT SERPL-CCNC: 33 U/L (ref 0–32)
ANION GAP SERPL CALCULATED.3IONS-SCNC: 12 MMOL/L (ref 7–16)
AST SERPL-CCNC: 19 U/L (ref 0–31)
BASOPHILS # BLD: 0.06 K/UL (ref 0–0.2)
BASOPHILS NFR BLD: 1 % (ref 0–2)
BILIRUB SERPL-MCNC: 1.1 MG/DL (ref 0–1.2)
BUN SERPL-MCNC: 8 MG/DL (ref 6–20)
CALCIUM SERPL-MCNC: 9.7 MG/DL (ref 8.6–10.2)
CHLORIDE SERPL-SCNC: 99 MMOL/L (ref 98–107)
CO2 SERPL-SCNC: 25 MMOL/L (ref 22–29)
CREAT SERPL-MCNC: 0.8 MG/DL (ref 0.5–1)
EOSINOPHIL # BLD: 0.18 K/UL (ref 0.05–0.5)
EOSINOPHILS RELATIVE PERCENT: 2 % (ref 0–6)
ERYTHROCYTE [DISTWIDTH] IN BLOOD BY AUTOMATED COUNT: 13.1 % (ref 11.5–15)
GFR SERPL CREATININE-BSD FRML MDRD: >90 ML/MIN/1.73M2
GLUCOSE SERPL-MCNC: 90 MG/DL (ref 74–99)
HCT VFR BLD AUTO: 43.6 % (ref 34–48)
HGB BLD-MCNC: 14.4 G/DL (ref 11.5–15.5)
IMM GRANULOCYTES # BLD AUTO: 0.03 K/UL (ref 0–0.58)
IMM GRANULOCYTES NFR BLD: 0 % (ref 0–5)
LYMPHOCYTES NFR BLD: 1.89 K/UL (ref 1.5–4)
LYMPHOCYTES RELATIVE PERCENT: 19 % (ref 20–42)
MAGNESIUM SERPL-MCNC: 1.9 MG/DL (ref 1.6–2.6)
MCH RBC QN AUTO: 30.4 PG (ref 26–35)
MCHC RBC AUTO-ENTMCNC: 33 G/DL (ref 32–34.5)
MCV RBC AUTO: 92.2 FL (ref 80–99.9)
MONOCYTES NFR BLD: 0.72 K/UL (ref 0.1–0.95)
MONOCYTES NFR BLD: 7 % (ref 2–12)
NEUTROPHILS NFR BLD: 71 % (ref 43–80)
NEUTS SEG NFR BLD: 6.9 K/UL (ref 1.8–7.3)
PLATELET # BLD AUTO: 249 K/UL (ref 130–450)
PMV BLD AUTO: 11.2 FL (ref 7–12)
POTASSIUM SERPL-SCNC: 3.5 MMOL/L (ref 3.5–5)
PROT SERPL-MCNC: 7.9 G/DL (ref 6.4–8.3)
RBC # BLD AUTO: 4.73 M/UL (ref 3.5–5.5)
SODIUM SERPL-SCNC: 136 MMOL/L (ref 132–146)
WBC OTHER # BLD: 9.8 K/UL (ref 4.5–11.5)

## 2024-04-16 PROCEDURE — 83735 ASSAY OF MAGNESIUM: CPT

## 2024-04-16 PROCEDURE — 70450 CT HEAD/BRAIN W/O DYE: CPT

## 2024-04-16 PROCEDURE — 96374 THER/PROPH/DIAG INJ IV PUSH: CPT

## 2024-04-16 PROCEDURE — 80053 COMPREHEN METABOLIC PANEL: CPT

## 2024-04-16 PROCEDURE — 2580000003 HC RX 258: Performed by: NURSE PRACTITIONER

## 2024-04-16 PROCEDURE — 6360000002 HC RX W HCPCS: Performed by: NURSE PRACTITIONER

## 2024-04-16 PROCEDURE — 96375 TX/PRO/DX INJ NEW DRUG ADDON: CPT

## 2024-04-16 PROCEDURE — 85025 COMPLETE CBC W/AUTO DIFF WBC: CPT

## 2024-04-16 RX ADMIN — METOCLOPRAMIDE 10 MG: 5 INJECTION, SOLUTION INTRAMUSCULAR; INTRAVENOUS at 01:16

## 2024-04-16 RX ADMIN — SODIUM CHLORIDE 1000 ML: 9 INJECTION, SOLUTION INTRAVENOUS at 01:15

## 2024-04-16 RX ADMIN — DIPHENHYDRAMINE HYDROCHLORIDE 25 MG: 50 INJECTION, SOLUTION INTRAMUSCULAR; INTRAVENOUS at 01:16

## 2024-04-16 ASSESSMENT — PAIN - FUNCTIONAL ASSESSMENT: PAIN_FUNCTIONAL_ASSESSMENT: NONE - DENIES PAIN

## 2024-04-16 NOTE — ED PROVIDER NOTES
Independent ARIADNE Visit.       [facility] EMERGENCY DEPARTMENT  ED  Encounter Note  Admit Date/RoomTime: 2024  1:49 AM  ED Room: [place] [address]  NAME: Cha Block  : 2000  MRN: [id number]  PCP: No primary care provider on file.    CHIEF COMPLAINT     Headache (PT reports HA x 2 days that worsens with standing.  PT reports presenting to ED r/t vomiting after standing.  PT denies blood in vomit.  PT reports pain improves with OTC meds)    HISTORY OF PRESENT ILLNESS        Cha Block is a 23 y.o. female who presents to the ED via private vehicle with headache intermittently for 2 days worsening with standing.  States she had a single episode of vomiting.  Reports headache resolves after over-the-counter medications.  No headache at present.  No prior history of migraines.  No photophobia.  No fever chills or chest pain or shortness of breath.  No weakness or numbness.  She has not had any recent trauma.  Pain has been mild and intermittent.  Currently headache is resolving after Tylenol  REVIEW OF SYSTEMS     Pertinent positives and negatives are stated within HPI, all other systems reviewed and are negative.    Past Medical History:  has no past medical history on file.  Surgical History:  has no past surgical history on file.  Social History:  reports that she has quit smoking. She started smoking about 4 years ago. She has never used smokeless tobacco.  Family History: family history is not on file.   Allergies: Latex, Betadine [povidone-iodine], and Iodine  CURRENT MEDICATIONS       Previous Medications    FLUCONAZOLE (DIFLUCAN) 150 MG TABLET    1 tab po x 1 dose, may repeat in 72 hrs if still symptomatic       SCREENINGS     [place] Coma Scale  Eye Opening: Spontaneous  Best Verbal Response: Oriented  Best Motor Response: Obeys commands  [place] Coma Scale Score: 15         CIWA Assessment  BP: 134/68  Pulse: 72       PHYSICAL EXAM   Oxygen Saturation Interpretation:

## 2024-06-05 ENCOUNTER — TELEPHONE (OUTPATIENT)
Dept: FAMILY MEDICINE CLINIC | Age: 24
End: 2024-06-05

## 2024-06-05 NOTE — TELEPHONE ENCOUNTER
Received a call from Sadie regarding Cha needing to move her appt that was scheduled with Dr Talavera on Friday 6/28 to something sooner. Let her know I needed to double check with Dr Talavera that we could add her to a sooner day, since his schedule was full for NP appts; Sadie also reached out to him, but didn't get an answer.    Spoke with Dr Talavera, and he is fine with us overriding his NP warning and moving Cha's appt to Friday 6/14 if that will work for her.     Did reach out to Cha, but got no answer & VM was full. Did reach out to Sadie as well, but got her VM.. left message and asked if she could notify Cha to call us back to move appt. Did provide my direct line ([phone]) for them to reach me at.

## 2024-06-14 ENCOUNTER — OFFICE VISIT (OUTPATIENT)
Dept: FAMILY MEDICINE CLINIC | Age: 24
End: 2024-06-14
Payer: COMMERCIAL

## 2024-06-14 VITALS
TEMPERATURE: 98 F | HEIGHT: 64 IN | SYSTOLIC BLOOD PRESSURE: 110 MMHG | DIASTOLIC BLOOD PRESSURE: 72 MMHG | WEIGHT: 167 LBS | OXYGEN SATURATION: 99 % | HEART RATE: 91 BPM | BODY MASS INDEX: 28.51 KG/M2

## 2024-06-14 DIAGNOSIS — F41.9 ANXIETY: Primary | ICD-10-CM

## 2024-06-14 PROBLEM — J45.30 MILD PERSISTENT ASTHMA, UNCOMPLICATED: Status: ACTIVE | Noted: 2024-06-14

## 2024-06-14 PROCEDURE — 99202 OFFICE O/P NEW SF 15 MIN: CPT | Performed by: STUDENT IN AN ORGANIZED HEALTH CARE EDUCATION/TRAINING PROGRAM

## 2024-06-14 ASSESSMENT — PATIENT HEALTH QUESTIONNAIRE - PHQ9
2. FEELING DOWN, DEPRESSED OR HOPELESS: NOT AT ALL
SUM OF ALL RESPONSES TO PHQ QUESTIONS 1-9: 0
1. LITTLE INTEREST OR PLEASURE IN DOING THINGS: NOT AT ALL
SUM OF ALL RESPONSES TO PHQ9 QUESTIONS 1 & 2: 0
SUM OF ALL RESPONSES TO PHQ QUESTIONS 1-9: 0
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
